# Patient Record
Sex: FEMALE | Race: WHITE
[De-identification: names, ages, dates, MRNs, and addresses within clinical notes are randomized per-mention and may not be internally consistent; named-entity substitution may affect disease eponyms.]

---

## 2021-11-18 ENCOUNTER — HOSPITAL ENCOUNTER (INPATIENT)
Dept: HOSPITAL 94 - ER | Age: 71
LOS: 3 days | Discharge: HOME | DRG: 419 | End: 2021-11-21
Attending: INTERNAL MEDICINE | Admitting: SPECIALIST
Payer: MEDICARE

## 2021-11-18 VITALS — HEIGHT: 66 IN | WEIGHT: 146.3 LBS | BODY MASS INDEX: 23.51 KG/M2

## 2021-11-18 DIAGNOSIS — I10: ICD-10-CM

## 2021-11-18 DIAGNOSIS — I48.91: ICD-10-CM

## 2021-11-18 DIAGNOSIS — K80.00: Primary | ICD-10-CM

## 2021-11-18 DIAGNOSIS — Z20.822: ICD-10-CM

## 2021-11-18 DIAGNOSIS — Z90.710: ICD-10-CM

## 2021-11-18 DIAGNOSIS — E03.9: ICD-10-CM

## 2021-11-18 DIAGNOSIS — Z79.899: ICD-10-CM

## 2021-11-18 DIAGNOSIS — G47.33: ICD-10-CM

## 2021-11-18 LAB
ALBUMIN SERPL BCP-MCNC: 3.7 G/DL (ref 3.4–5)
ALBUMIN/GLOB SERPL: 0.9 {RATIO} (ref 1.1–1.5)
ALP SERPL-CCNC: 117 IU/L (ref 46–116)
ALT SERPL W P-5'-P-CCNC: 27 U/L (ref 12–78)
ANION GAP SERPL CALCULATED.3IONS-SCNC: 14 MMOL/L (ref 8–16)
ANISOCYTOSIS BLD QL SMEAR: (no result)
AST SERPL W P-5'-P-CCNC: 19 U/L (ref 10–37)
BACTERIA URNS QL MICRO: (no result) /HPF
BASOPHILS # BLD AUTO: 0 X10'3 (ref 0–0.2)
BASOPHILS NFR BLD AUTO: 0.1 % (ref 0–1)
BILIRUB SERPL-MCNC: 1.6 MG/DL (ref 0.1–1)
BUN SERPL-MCNC: 15 MG/DL (ref 7–18)
BUN/CREAT SERPL: 20 (ref 6.6–38)
CALCIUM SERPL-MCNC: 9.1 MG/DL (ref 8.5–10.1)
CHLORIDE SERPL-SCNC: 101 MMOL/L (ref 99–107)
CLARITY UR: (no result)
CO2 SERPL-SCNC: 22.5 MMOL/L (ref 24–32)
COLOR UR: YELLOW
CREAT SERPL-MCNC: 0.75 MG/DL (ref 0.4–0.9)
DEPRECATED SQUAMOUS URNS QL MICRO: (no result) /LPF
EOSINOPHIL # BLD AUTO: 0 X10'3 (ref 0–0.9)
EOSINOPHIL NFR BLD AUTO: 0 % (ref 0–6)
ERYTHROCYTE [DISTWIDTH] IN BLOOD BY AUTOMATED COUNT: 13.3 % (ref 11.5–14.5)
GFR SERPL CREATININE-BSD FRML MDRD: 76 ML/MIN
GLUCOSE SERPL-MCNC: 132 MG/DL (ref 70–104)
GLUCOSE UR STRIP-MCNC: NEGATIVE MG/DL
HCT VFR BLD AUTO: 45.9 % (ref 35–45)
HGB BLD-MCNC: 15.9 G/DL (ref 12–16)
HGB UR QL STRIP: (no result)
KETONES UR STRIP-MCNC: 40 MG/DL
LEUKOCYTE ESTERASE UR QL STRIP: NEGATIVE
LG PLATELETS BLD QL SMEAR: (no result)
LIPASE SERPL-CCNC: 56 U/L (ref 73–393)
LYMPHOCYTES # BLD AUTO: 1 X10'3 (ref 1.1–4.8)
LYMPHOCYTES NFR BLD AUTO: 5.6 % (ref 21–51)
LYMPHOCYTES NFR BLD MANUAL: 6 % (ref 21–51)
MACROCYTES BLD QL SMEAR: (no result)
MCH RBC QN AUTO: 37.6 PG (ref 27–31)
MCHC RBC AUTO-ENTMCNC: 34.7 G/DL (ref 33–36.5)
MCV RBC AUTO: 108.6 FL (ref 78–98)
MONOCYTES # BLD AUTO: 1.3 X10'3 (ref 0–0.9)
MONOCYTES NFR BLD AUTO: 7.3 % (ref 2–12)
MONOCYTES NFR BLD MANUAL: 6 % (ref 2–12)
MUCOUS THREADS URNS QL MICRO: (no result) /LPF
NEUTROPHILS # BLD AUTO: 15.8 X10'3 (ref 1.8–7.7)
NEUTROPHILS NFR BLD AUTO: 87 % (ref 42–75)
NEUTS BAND # BLD MANUAL: 12 % (ref 0–10)
NEUTS SEG NFR BLD MANUAL: 76 % (ref 42–75)
NITRITE UR QL STRIP: NEGATIVE
PH UR STRIP: 5 [PH] (ref 4.8–8)
PLATELET # BLD AUTO: 200 X10'3 (ref 140–440)
PLATELET BLD QL SMEAR: NORMAL
PMV BLD AUTO: 9.1 FL (ref 7.4–10.4)
POTASSIUM SERPL-SCNC: 4.1 MMOL/L (ref 3.5–5.1)
PROT SERPL-MCNC: 7.9 G/DL (ref 6.4–8.2)
PROT UR QL STRIP: (no result) MG/DL
RBC # BLD AUTO: 4.23 X10'6 (ref 4.2–5.6)
RBC #/AREA URNS HPF: (no result) /HPF (ref 0–2)
RBC MORPH BLD: (no result)
SODIUM SERPL-SCNC: 137 MMOL/L (ref 135–145)
SP GR UR STRIP: 1 (ref 1–1.03)
TOTAL CELLS COUNTED FLD: 100
URN COLLECT METHOD CLASS: (no result)
UROBILINOGEN UR STRIP-MCNC: 0.2 E.U/DL (ref 0.2–1)
WBC # BLD AUTO: 18.2 X10'3 (ref 4.5–11)
WBC #/AREA URNS HPF: (no result) /HPF (ref 0–4)

## 2021-11-18 PROCEDURE — 87635 SARS-COV-2 COVID-19 AMP PRB: CPT

## 2021-11-18 PROCEDURE — 85025 COMPLETE CBC W/AUTO DIFF WBC: CPT

## 2021-11-18 PROCEDURE — 84484 ASSAY OF TROPONIN QUANT: CPT

## 2021-11-18 PROCEDURE — 85610 PROTHROMBIN TIME: CPT

## 2021-11-18 PROCEDURE — 74176 CT ABD & PELVIS W/O CONTRAST: CPT

## 2021-11-18 PROCEDURE — 83690 ASSAY OF LIPASE: CPT

## 2021-11-18 PROCEDURE — 85008 BL SMEAR W/O DIFF WBC COUNT: CPT

## 2021-11-18 PROCEDURE — 80162 ASSAY OF DIGOXIN TOTAL: CPT

## 2021-11-18 PROCEDURE — 76700 US EXAM ABDOM COMPLETE: CPT

## 2021-11-18 PROCEDURE — 36415 COLL VENOUS BLD VENIPUNCTURE: CPT

## 2021-11-18 PROCEDURE — 87081 CULTURE SCREEN ONLY: CPT

## 2021-11-18 PROCEDURE — 81001 URINALYSIS AUTO W/SCOPE: CPT

## 2021-11-18 PROCEDURE — 71045 X-RAY EXAM CHEST 1 VIEW: CPT

## 2021-11-18 PROCEDURE — 83605 ASSAY OF LACTIC ACID: CPT

## 2021-11-18 PROCEDURE — 99285 EMERGENCY DEPT VISIT HI MDM: CPT

## 2021-11-18 PROCEDURE — 87040 BLOOD CULTURE FOR BACTERIA: CPT

## 2021-11-18 PROCEDURE — 80053 COMPREHEN METABOLIC PANEL: CPT

## 2021-11-18 PROCEDURE — 84145 PROCALCITONIN (PCT): CPT

## 2021-11-18 PROCEDURE — 93005 ELECTROCARDIOGRAM TRACING: CPT

## 2021-11-18 PROCEDURE — 87088 URINE BACTERIA CULTURE: CPT

## 2021-11-18 PROCEDURE — 85007 BL SMEAR W/DIFF WBC COUNT: CPT

## 2021-11-18 RX ADMIN — SODIUM CHLORIDE SCH MLS/HR: 9 INJECTION INTRAMUSCULAR; INTRAVENOUS; SUBCUTANEOUS at 19:31

## 2021-11-18 RX ADMIN — DOCUSATE SODIUM SCH MG: 100 CAPSULE, LIQUID FILLED ORAL at 19:34

## 2021-11-19 VITALS — SYSTOLIC BLOOD PRESSURE: 122 MMHG | DIASTOLIC BLOOD PRESSURE: 67 MMHG

## 2021-11-19 VITALS — DIASTOLIC BLOOD PRESSURE: 56 MMHG | SYSTOLIC BLOOD PRESSURE: 133 MMHG

## 2021-11-19 VITALS — DIASTOLIC BLOOD PRESSURE: 78 MMHG | SYSTOLIC BLOOD PRESSURE: 138 MMHG

## 2021-11-19 VITALS — SYSTOLIC BLOOD PRESSURE: 140 MMHG | DIASTOLIC BLOOD PRESSURE: 74 MMHG

## 2021-11-19 VITALS — DIASTOLIC BLOOD PRESSURE: 76 MMHG | SYSTOLIC BLOOD PRESSURE: 119 MMHG

## 2021-11-19 VITALS — DIASTOLIC BLOOD PRESSURE: 73 MMHG | SYSTOLIC BLOOD PRESSURE: 139 MMHG

## 2021-11-19 VITALS — DIASTOLIC BLOOD PRESSURE: 68 MMHG | SYSTOLIC BLOOD PRESSURE: 145 MMHG

## 2021-11-19 VITALS — DIASTOLIC BLOOD PRESSURE: 62 MMHG | SYSTOLIC BLOOD PRESSURE: 140 MMHG

## 2021-11-19 VITALS — SYSTOLIC BLOOD PRESSURE: 140 MMHG | DIASTOLIC BLOOD PRESSURE: 79 MMHG

## 2021-11-19 VITALS — DIASTOLIC BLOOD PRESSURE: 74 MMHG | SYSTOLIC BLOOD PRESSURE: 140 MMHG

## 2021-11-19 VITALS — DIASTOLIC BLOOD PRESSURE: 67 MMHG | SYSTOLIC BLOOD PRESSURE: 157 MMHG

## 2021-11-19 VITALS — SYSTOLIC BLOOD PRESSURE: 127 MMHG | DIASTOLIC BLOOD PRESSURE: 68 MMHG

## 2021-11-19 VITALS — SYSTOLIC BLOOD PRESSURE: 153 MMHG | DIASTOLIC BLOOD PRESSURE: 66 MMHG

## 2021-11-19 VITALS — SYSTOLIC BLOOD PRESSURE: 134 MMHG | DIASTOLIC BLOOD PRESSURE: 74 MMHG

## 2021-11-19 VITALS — DIASTOLIC BLOOD PRESSURE: 83 MMHG | SYSTOLIC BLOOD PRESSURE: 144 MMHG

## 2021-11-19 LAB
ALBUMIN SERPL BCP-MCNC: 2.8 G/DL (ref 3.4–5)
ALBUMIN/GLOB SERPL: 0.8 {RATIO} (ref 1.1–1.5)
ALP SERPL-CCNC: 99 IU/L (ref 46–116)
ALT SERPL W P-5'-P-CCNC: 19 U/L (ref 12–78)
ANION GAP SERPL CALCULATED.3IONS-SCNC: 11 MMOL/L (ref 8–16)
AST SERPL W P-5'-P-CCNC: 14 U/L (ref 10–37)
BASOPHILS # BLD AUTO: 0 X10'3 (ref 0–0.2)
BASOPHILS NFR BLD AUTO: 0.1 % (ref 0–1)
BILIRUB SERPL-MCNC: 1.6 MG/DL (ref 0.1–1)
BUN SERPL-MCNC: 16 MG/DL (ref 7–18)
BUN/CREAT SERPL: 22.5 (ref 6.6–38)
CALCIUM SERPL-MCNC: 8.1 MG/DL (ref 8.5–10.1)
CHLORIDE SERPL-SCNC: 106 MMOL/L (ref 99–107)
CO2 SERPL-SCNC: 23.2 MMOL/L (ref 24–32)
CREAT SERPL-MCNC: 0.71 MG/DL (ref 0.4–0.9)
EOSINOPHIL # BLD AUTO: 0.1 X10'3 (ref 0–0.9)
EOSINOPHIL NFR BLD AUTO: 0.8 % (ref 0–6)
ERYTHROCYTE [DISTWIDTH] IN BLOOD BY AUTOMATED COUNT: 13 % (ref 11.5–14.5)
GFR SERPL CREATININE-BSD FRML MDRD: 81 ML/MIN
GLUCOSE SERPL-MCNC: 99 MG/DL (ref 70–104)
HCT VFR BLD AUTO: 40.4 % (ref 35–45)
HGB BLD-MCNC: 14.1 G/DL (ref 12–16)
LYMPHOCYTES # BLD AUTO: 1.1 X10'3 (ref 1.1–4.8)
LYMPHOCYTES NFR BLD AUTO: 7.6 % (ref 21–51)
MCH RBC QN AUTO: 38.1 PG (ref 27–31)
MCHC RBC AUTO-ENTMCNC: 34.9 G/DL (ref 33–36.5)
MCV RBC AUTO: 109 FL (ref 78–98)
MONOCYTES # BLD AUTO: 1.3 X10'3 (ref 0–0.9)
MONOCYTES NFR BLD AUTO: 8.9 % (ref 2–12)
NEUTROPHILS # BLD AUTO: 12 X10'3 (ref 1.8–7.7)
NEUTROPHILS NFR BLD AUTO: 82.6 % (ref 42–75)
PLATELET # BLD AUTO: 186 X10'3 (ref 140–440)
PMV BLD AUTO: 9.1 FL (ref 7.4–10.4)
POTASSIUM SERPL-SCNC: 3.8 MMOL/L (ref 3.5–5.1)
PROT SERPL-MCNC: 6.1 G/DL (ref 6.4–8.2)
RBC # BLD AUTO: 3.71 X10'6 (ref 4.2–5.6)
SODIUM SERPL-SCNC: 140 MMOL/L (ref 135–145)
WBC # BLD AUTO: 14.5 X10'3 (ref 4.5–11)

## 2021-11-19 PROCEDURE — 8E0W4CZ ROBOTIC ASSISTED PROCEDURE OF TRUNK REGION, PERCUTANEOUS ENDOSCOPIC APPROACH: ICD-10-PCS | Performed by: SURGERY

## 2021-11-19 PROCEDURE — 0FT44ZZ RESECTION OF GALLBLADDER, PERCUTANEOUS ENDOSCOPIC APPROACH: ICD-10-PCS | Performed by: SURGERY

## 2021-11-19 RX ADMIN — TAZOBACTAM SODIUM AND PIPERACILLIN SODIUM SCH MLS/HR: 375; 3 INJECTION, SOLUTION INTRAVENOUS at 00:43

## 2021-11-19 RX ADMIN — SODIUM CHLORIDE SCH MLS/HR: 9 INJECTION INTRAMUSCULAR; INTRAVENOUS; SUBCUTANEOUS at 15:20

## 2021-11-19 RX ADMIN — TAZOBACTAM SODIUM AND PIPERACILLIN SODIUM SCH MLS/HR: 375; 3 INJECTION, SOLUTION INTRAVENOUS at 16:54

## 2021-11-19 RX ADMIN — SODIUM CHLORIDE SCH MLS/HR: 9 INJECTION INTRAMUSCULAR; INTRAVENOUS; SUBCUTANEOUS at 00:26

## 2021-11-19 RX ADMIN — HEPARIN SODIUM SCH UNIT: 5000 INJECTION, SOLUTION INTRAVENOUS; SUBCUTANEOUS at 15:23

## 2021-11-19 RX ADMIN — TAZOBACTAM SODIUM AND PIPERACILLIN SODIUM SCH MLS/HR: 375; 3 INJECTION, SOLUTION INTRAVENOUS at 09:35

## 2021-11-19 RX ADMIN — DOCUSATE SODIUM SCH MG: 100 CAPSULE, LIQUID FILLED ORAL at 08:00

## 2021-11-19 RX ADMIN — DOCUSATE SODIUM SCH MG: 100 CAPSULE, LIQUID FILLED ORAL at 23:47

## 2021-11-19 RX ADMIN — SODIUM CHLORIDE SCH MLS/HR: 9 INJECTION INTRAMUSCULAR; INTRAVENOUS; SUBCUTANEOUS at 23:48

## 2021-11-19 RX ADMIN — DIGOXIN SCH MCG: 0.25 TABLET ORAL at 09:36

## 2021-11-19 NOTE — NUR
Patient in room . I have received report from  Senia MANN  and had the opportunity to 
ask questions and assume patient care.

## 2021-11-19 NOTE — NUR
Received from OR via SURGICAL BED , accompanied by Anesthesiologist DIMITRIOS and report 
given by Anesthesiolgist. PATIENT WITH 20G PIV IN RIGHT UE. VSS. AFIB. 3 ABDOMINAL LAP SITES 
PRESENT. SEVERAL SPOTS FROM PRIOR EKG PADS THAT HAVE LEFT REDDENED AREA OF INTACT.DENIES 
PAIN AT THIS TIME.


-------------------------------------------------------------------------------

Addendum: 11/19/21 at 2031 by Neeraj Devlin RN, RN

-------------------------------------------------------------------------------

Amended: Links added.

## 2021-11-19 NOTE — NUR
Patient in room . I have received report from JOHNATHAN Clark    and had the opportunity to 
ask questions and assume patient care.

## 2021-11-19 NOTE — NUR
Report called to receiving nurse. Transferred via SURGICAL BED WITH NO Belongings . 

Special Issues communicated to receiving nurse JARRET LOREDO RN.BED LOW, CALL LIGHT 
PRESENT. VSS. DENIES PAIN AT THIS TIME. CALL LIGHT PRESENT AND PATIENT INSTRUCTED NOT TO GET 
UP WITHOUT ASSIST. CARE TURNED OVER AND ALL QUESTIONS ANSWERED


-------------------------------------------------------------------------------

Addendum: 11/19/21 at 2104 by Neeraj Devlin RN RN

-------------------------------------------------------------------------------

Amended: Links added.

## 2021-11-19 NOTE — NUR
Patient in room . I have received report from  JOHNATHAN Lugo   and had the opportunity to 
ask questions and assume patient care. Patient is in OR curently

## 2021-11-19 NOTE — NUR
Problems reprioritized. Patient report given, questions answered & plan of care reviewed 
with Zachariah MANN.

## 2021-11-20 VITALS — DIASTOLIC BLOOD PRESSURE: 56 MMHG | SYSTOLIC BLOOD PRESSURE: 104 MMHG

## 2021-11-20 VITALS — DIASTOLIC BLOOD PRESSURE: 86 MMHG | SYSTOLIC BLOOD PRESSURE: 134 MMHG

## 2021-11-20 VITALS — SYSTOLIC BLOOD PRESSURE: 124 MMHG | DIASTOLIC BLOOD PRESSURE: 82 MMHG

## 2021-11-20 VITALS — DIASTOLIC BLOOD PRESSURE: 65 MMHG | SYSTOLIC BLOOD PRESSURE: 121 MMHG

## 2021-11-20 VITALS — SYSTOLIC BLOOD PRESSURE: 108 MMHG | DIASTOLIC BLOOD PRESSURE: 52 MMHG

## 2021-11-20 VITALS — SYSTOLIC BLOOD PRESSURE: 141 MMHG | DIASTOLIC BLOOD PRESSURE: 78 MMHG

## 2021-11-20 VITALS — SYSTOLIC BLOOD PRESSURE: 117 MMHG | DIASTOLIC BLOOD PRESSURE: 59 MMHG

## 2021-11-20 VITALS — DIASTOLIC BLOOD PRESSURE: 66 MMHG | SYSTOLIC BLOOD PRESSURE: 106 MMHG

## 2021-11-20 LAB
ALBUMIN SERPL BCP-MCNC: 2.4 G/DL (ref 3.4–5)
ALBUMIN/GLOB SERPL: 0.7 {RATIO} (ref 1.1–1.5)
ALP SERPL-CCNC: 104 IU/L (ref 46–116)
ALT SERPL W P-5'-P-CCNC: 28 U/L (ref 12–78)
ANION GAP SERPL CALCULATED.3IONS-SCNC: 10 MMOL/L (ref 8–16)
AST SERPL W P-5'-P-CCNC: 37 U/L (ref 10–37)
BASOPHILS # BLD AUTO: 0 X10'3 (ref 0–0.2)
BASOPHILS NFR BLD AUTO: 0.1 % (ref 0–1)
BILIRUB SERPL-MCNC: 1 MG/DL (ref 0.1–1)
BUN SERPL-MCNC: 15 MG/DL (ref 7–18)
BUN/CREAT SERPL: 22.1 (ref 6.6–38)
CALCIUM SERPL-MCNC: 7.7 MG/DL (ref 8.5–10.1)
CHLORIDE SERPL-SCNC: 106 MMOL/L (ref 99–107)
CO2 SERPL-SCNC: 22.9 MMOL/L (ref 24–32)
CREAT SERPL-MCNC: 0.68 MG/DL (ref 0.4–0.9)
EOSINOPHIL # BLD AUTO: 0 X10'3 (ref 0–0.9)
EOSINOPHIL NFR BLD AUTO: 0 % (ref 0–6)
ERYTHROCYTE [DISTWIDTH] IN BLOOD BY AUTOMATED COUNT: 12.9 % (ref 11.5–14.5)
GFR SERPL CREATININE-BSD FRML MDRD: 85 ML/MIN
GLUCOSE SERPL-MCNC: 156 MG/DL (ref 70–104)
HCT VFR BLD AUTO: 40.8 % (ref 35–45)
HGB BLD-MCNC: 14.1 G/DL (ref 12–16)
LYMPHOCYTES # BLD AUTO: 0.4 X10'3 (ref 1.1–4.8)
LYMPHOCYTES NFR BLD AUTO: 3.4 % (ref 21–51)
MACROCYTES BLD QL SMEAR: (no result)
MCH RBC QN AUTO: 38.1 PG (ref 27–31)
MCHC RBC AUTO-ENTMCNC: 34.5 G/DL (ref 33–36.5)
MCV RBC AUTO: 110.4 FL (ref 78–98)
MONOCYTES # BLD AUTO: 0.5 X10'3 (ref 0–0.9)
MONOCYTES NFR BLD AUTO: 4.8 % (ref 2–12)
NEUTROPHILS # BLD AUTO: 9.9 X10'3 (ref 1.8–7.7)
NEUTROPHILS NFR BLD AUTO: 91.7 % (ref 42–75)
PLATELET # BLD AUTO: 192 X10'3 (ref 140–440)
PLATELET BLD QL SMEAR: NORMAL
PMV BLD AUTO: 8.9 FL (ref 7.4–10.4)
POTASSIUM SERPL-SCNC: 4 MMOL/L (ref 3.5–5.1)
PROT SERPL-MCNC: 6 G/DL (ref 6.4–8.2)
RBC # BLD AUTO: 3.7 X10'6 (ref 4.2–5.6)
RBC MORPH BLD: (no result)
SODIUM SERPL-SCNC: 139 MMOL/L (ref 135–145)
WBC # BLD AUTO: 10.8 X10'3 (ref 4.5–11)

## 2021-11-20 RX ADMIN — TAZOBACTAM SODIUM AND PIPERACILLIN SODIUM SCH MLS/HR: 375; 3 INJECTION, SOLUTION INTRAVENOUS at 10:04

## 2021-11-20 RX ADMIN — SODIUM CHLORIDE SCH MLS/HR: 9 INJECTION INTRAMUSCULAR; INTRAVENOUS; SUBCUTANEOUS at 21:20

## 2021-11-20 RX ADMIN — DOCUSATE SODIUM SCH MG: 100 CAPSULE, LIQUID FILLED ORAL at 19:45

## 2021-11-20 RX ADMIN — LEVOTHYROXINE, LIOTHYRONINE SCH MG: 19; 4.5 TABLET ORAL at 10:06

## 2021-11-20 RX ADMIN — TAZOBACTAM SODIUM AND PIPERACILLIN SODIUM SCH MLS/HR: 375; 3 INJECTION, SOLUTION INTRAVENOUS at 02:17

## 2021-11-20 RX ADMIN — SODIUM CHLORIDE SCH MLS/HR: 9 INJECTION INTRAMUSCULAR; INTRAVENOUS; SUBCUTANEOUS at 11:20

## 2021-11-20 RX ADMIN — DOCUSATE SODIUM SCH MG: 100 CAPSULE, LIQUID FILLED ORAL at 08:00

## 2021-11-20 RX ADMIN — DIGOXIN SCH MCG: 0.25 TABLET ORAL at 08:00

## 2021-11-20 RX ADMIN — HYDROCODONE BITARTRATE AND ACETAMINOPHEN PRN TAB: 5; 325 TABLET ORAL at 14:23

## 2021-11-20 RX ADMIN — TAZOBACTAM SODIUM AND PIPERACILLIN SODIUM SCH MLS/HR: 375; 3 INJECTION, SOLUTION INTRAVENOUS at 18:59

## 2021-11-20 RX ADMIN — HEPARIN SODIUM SCH UNIT: 5000 INJECTION, SOLUTION INTRAVENOUS; SUBCUTANEOUS at 08:00

## 2021-11-20 RX ADMIN — HEPARIN SODIUM SCH UNIT: 5000 INJECTION, SOLUTION INTRAVENOUS; SUBCUTANEOUS at 19:46

## 2021-11-20 RX ADMIN — HYDROCODONE BITARTRATE AND ACETAMINOPHEN PRN TAB: 5; 325 TABLET ORAL at 19:46

## 2021-11-20 NOTE — NUR
Patient in room PCU 3012. I have received report from JOHNATHAN Celis  and had the opportunity to 
ask questions and assume patient care.

## 2021-11-20 NOTE — NUR
Problems reprioritized. Patient report given, questions answered & plan of care reviewed 
with Ching MANN.

## 2021-11-20 NOTE — NUR
Pt 1600 Zosyn was hung at 1859. Spoke to Enriqueta and she said that the 0000 Zosyn could be 
hung at 0200 and the 0800 dose can be given at 0900 and that should get her back on track.

## 2021-11-20 NOTE — NUR
Student documentation:

I have reviewed and agree with all interventions, assessments performed and documented by 
Cindy Helton.

Student Medication Administration:

For this medication-pass time frame, all medication were reviewed, dispensed, administered 
and documented per hospital policy by Cindy Helton.

## 2021-11-20 NOTE — NUR
Bedside check with soila RN - dressing has shadow that is within it's marked boundry. bs 
hypo, ls clear, mil dpain with db. a/ox4 events of yesterday "blurry "

## 2021-11-20 NOTE — NUR
Patient in room PCU 3012. I have received report from  Patsy MANN  and had the opportunity to 
ask questions and assume patient care.

## 2021-11-20 NOTE — NUR
Problems reprioritized. Patient report given, questions answered & plan of care reviewed 
with Vimal MANN.

## 2021-11-21 VITALS — DIASTOLIC BLOOD PRESSURE: 70 MMHG | SYSTOLIC BLOOD PRESSURE: 118 MMHG

## 2021-11-21 VITALS — SYSTOLIC BLOOD PRESSURE: 113 MMHG | DIASTOLIC BLOOD PRESSURE: 62 MMHG

## 2021-11-21 VITALS — SYSTOLIC BLOOD PRESSURE: 129 MMHG | DIASTOLIC BLOOD PRESSURE: 51 MMHG

## 2021-11-21 LAB
ALBUMIN SERPL BCP-MCNC: 2.4 G/DL (ref 3.4–5)
ALBUMIN/GLOB SERPL: 0.7 {RATIO} (ref 1.1–1.5)
ALP SERPL-CCNC: 86 IU/L (ref 46–116)
ALT SERPL W P-5'-P-CCNC: 35 U/L (ref 12–78)
ANION GAP SERPL CALCULATED.3IONS-SCNC: 7 MMOL/L (ref 8–16)
AST SERPL W P-5'-P-CCNC: 33 U/L (ref 10–37)
BASOPHILS # BLD AUTO: 0 X10'3 (ref 0–0.2)
BASOPHILS NFR BLD AUTO: 0.2 % (ref 0–1)
BILIRUB SERPL-MCNC: 0.5 MG/DL (ref 0.1–1)
BUN SERPL-MCNC: 14 MG/DL (ref 7–18)
BUN/CREAT SERPL: 17.7 (ref 6.6–38)
CALCIUM SERPL-MCNC: 8 MG/DL (ref 8.5–10.1)
CHLORIDE SERPL-SCNC: 108 MMOL/L (ref 99–107)
CO2 SERPL-SCNC: 26 MMOL/L (ref 24–32)
CREAT SERPL-MCNC: 0.79 MG/DL (ref 0.4–0.9)
EOSINOPHIL # BLD AUTO: 0.2 X10'3 (ref 0–0.9)
EOSINOPHIL NFR BLD AUTO: 2.1 % (ref 0–6)
ERYTHROCYTE [DISTWIDTH] IN BLOOD BY AUTOMATED COUNT: 12.7 % (ref 11.5–14.5)
GFR SERPL CREATININE-BSD FRML MDRD: 72 ML/MIN
GLUCOSE SERPL-MCNC: 104 MG/DL (ref 70–104)
HCT VFR BLD AUTO: 39.3 % (ref 35–45)
HGB BLD-MCNC: 13.8 G/DL (ref 12–16)
LYMPHOCYTES # BLD AUTO: 1.3 X10'3 (ref 1.1–4.8)
LYMPHOCYTES NFR BLD AUTO: 14.4 % (ref 21–51)
MCH RBC QN AUTO: 38.7 PG (ref 27–31)
MCHC RBC AUTO-ENTMCNC: 35.2 G/DL (ref 33–36.5)
MCV RBC AUTO: 110 FL (ref 78–98)
MONOCYTES # BLD AUTO: 0.8 X10'3 (ref 0–0.9)
MONOCYTES NFR BLD AUTO: 9.6 % (ref 2–12)
NEUTROPHILS # BLD AUTO: 6.4 X10'3 (ref 1.8–7.7)
NEUTROPHILS NFR BLD AUTO: 73.7 % (ref 42–75)
PLATELET # BLD AUTO: 217 X10'3 (ref 140–440)
PMV BLD AUTO: 8.8 FL (ref 7.4–10.4)
POTASSIUM SERPL-SCNC: 3.9 MMOL/L (ref 3.5–5.1)
PROT SERPL-MCNC: 5.8 G/DL (ref 6.4–8.2)
RBC # BLD AUTO: 3.57 X10'6 (ref 4.2–5.6)
SODIUM SERPL-SCNC: 141 MMOL/L (ref 135–145)
WBC # BLD AUTO: 8.7 X10'3 (ref 4.5–11)

## 2021-11-21 RX ADMIN — SODIUM CHLORIDE SCH MLS/HR: 9 INJECTION INTRAMUSCULAR; INTRAVENOUS; SUBCUTANEOUS at 02:12

## 2021-11-21 RX ADMIN — DOCUSATE SODIUM SCH MG: 100 CAPSULE, LIQUID FILLED ORAL at 07:54

## 2021-11-21 RX ADMIN — LEVOTHYROXINE, LIOTHYRONINE SCH MG: 19; 4.5 TABLET ORAL at 08:00

## 2021-11-21 RX ADMIN — HEPARIN SODIUM SCH UNIT: 5000 INJECTION, SOLUTION INTRAVENOUS; SUBCUTANEOUS at 07:56

## 2021-11-21 RX ADMIN — HYDROCODONE BITARTRATE AND ACETAMINOPHEN PRN TAB: 5; 325 TABLET ORAL at 07:55

## 2021-11-21 RX ADMIN — TAZOBACTAM SODIUM AND PIPERACILLIN SODIUM SCH MLS/HR: 375; 3 INJECTION, SOLUTION INTRAVENOUS at 02:02

## 2021-11-21 RX ADMIN — DIGOXIN SCH MCG: 0.25 TABLET ORAL at 07:55

## 2021-11-21 RX ADMIN — TAZOBACTAM SODIUM AND PIPERACILLIN SODIUM SCH MLS/HR: 375; 3 INJECTION, SOLUTION INTRAVENOUS at 10:50

## 2021-11-21 RX ADMIN — HYDROCODONE BITARTRATE AND ACETAMINOPHEN PRN TAB: 5; 325 TABLET ORAL at 12:36

## 2021-11-21 NOTE — NUR
Problems reprioritized. Patient report given, questions answered & plan of care reviewed 
with JOHNATHAN Romo.

## 2021-11-21 NOTE — NUR
Discontinued DELLA drain as per MD order. Patient tolerated well. Peripheral IV catheter 
removed, tip intact

## 2021-11-21 NOTE — NUR
Called pharmacy earlier time due to unavailability of Thyroid pill and Avodart due this am. 
Requested for delivery

## 2021-11-21 NOTE — NUR
Discharge instructions given to patient, patient verbalized understanding of all 
instructions made. Original, written prescription for Norco given to patient. ER admitted 
called to request return of patient's valuable kept in safe, I was told by the staff she 
will send someone to come to patient's room to give the patient valuable to the patient. 
Instructed patient to ensure she has all her belongings with her before leaving the 
hospital.

-------------------------------------------------------------------------------

Addendum: 11/21/21 at 1532 by Lana Zarate RN

-------------------------------------------------------------------------------

Patient just left the hospital , she confirmed she had her wallet with her before she left